# Patient Record
Sex: FEMALE | Race: BLACK OR AFRICAN AMERICAN
[De-identification: names, ages, dates, MRNs, and addresses within clinical notes are randomized per-mention and may not be internally consistent; named-entity substitution may affect disease eponyms.]

---

## 2017-07-09 ENCOUNTER — HOSPITAL ENCOUNTER (EMERGENCY)
Dept: HOSPITAL 74 - JER | Age: 67
Discharge: HOME | End: 2017-07-09
Payer: COMMERCIAL

## 2017-07-09 VITALS — HEART RATE: 83 BPM | TEMPERATURE: 99 F | SYSTOLIC BLOOD PRESSURE: 153 MMHG | DIASTOLIC BLOOD PRESSURE: 92 MMHG

## 2017-07-09 VITALS — BODY MASS INDEX: 32.9 KG/M2

## 2017-07-09 DIAGNOSIS — N20.0: ICD-10-CM

## 2017-07-09 DIAGNOSIS — K80.20: Primary | ICD-10-CM

## 2017-07-09 LAB
ALBUMIN SERPL-MCNC: 3.4 G/DL (ref 3.4–5)
ALP SERPL-CCNC: 77 U/L (ref 45–117)
ALT SERPL-CCNC: 68 U/L (ref 12–78)
ANION GAP SERPL CALC-SCNC: 10 MMOL/L (ref 8–16)
APPEARANCE UR: CLEAR
AST SERPL-CCNC: 42 U/L (ref 15–37)
BACTERIA #/AREA URNS HPF: (no result) /HPF
BASOPHILS # BLD: 0.8 % (ref 0–2)
BILIRUB SERPL-MCNC: 0.7 MG/DL (ref 0.2–1)
BILIRUB UR STRIP.AUTO-MCNC: NEGATIVE MG/DL
CALCIUM SERPL-MCNC: 9.4 MG/DL (ref 8.5–10.1)
CO2 SERPL-SCNC: 28 MMOL/L (ref 21–32)
COLOR UR: YELLOW
CREAT SERPL-MCNC: 1.4 MG/DL (ref 0.55–1.02)
DEPRECATED RDW RBC AUTO: 13 % (ref 11.6–15.6)
EOSINOPHIL # BLD: 0 % (ref 0–4.5)
GLUCOSE SERPL-MCNC: 185 MG/DL (ref 74–106)
KETONES UR QL STRIP: (no result)
LEUKOCYTE ESTERASE UR QL STRIP.AUTO: NEGATIVE
MCH RBC QN AUTO: 32 PG (ref 25.7–33.7)
MCHC RBC AUTO-ENTMCNC: 33.4 G/DL (ref 32–36)
MCV RBC: 95.8 FL (ref 80–96)
MUCOUS THREADS URNS QL MICRO: (no result)
NEUTROPHILS # BLD: 80.9 % (ref 42.8–82.8)
NITRITE UR QL STRIP: NEGATIVE
PH UR: 5 [PH] (ref 5–8)
PLATELET # BLD AUTO: 174 K/MM3 (ref 134–434)
PMV BLD: 9.8 FL (ref 7.5–11.1)
PROT SERPL-MCNC: 7.8 G/DL (ref 6.4–8.2)
PROT UR QL STRIP: (no result)
PROT UR QL STRIP: (no result)
RBC # BLD AUTO: 28 /HPF (ref 0–3)
RBC # UR STRIP: (no result) /UL
SP GR UR: 1.02 (ref 1–1.02)
UROBILINOGEN UR STRIP-MCNC: NEGATIVE E.U./DL (ref 0.2–1)
WBC # BLD AUTO: 10.3 K/MM3 (ref 4–10)
WBC # UR AUTO: 7 /HPF (ref 3–5)

## 2017-07-09 PROCEDURE — 3E03329 INTRODUCTION OF OTHER ANTI-INFECTIVE INTO PERIPHERAL VEIN, PERCUTANEOUS APPROACH: ICD-10-PCS

## 2017-07-09 PROCEDURE — 3E033NZ INTRODUCTION OF ANALGESICS, HYPNOTICS, SEDATIVES INTO PERIPHERAL VEIN, PERCUTANEOUS APPROACH: ICD-10-PCS

## 2017-07-09 PROCEDURE — 3E0337Z INTRODUCTION OF ELECTROLYTIC AND WATER BALANCE SUBSTANCE INTO PERIPHERAL VEIN, PERCUTANEOUS APPROACH: ICD-10-PCS

## 2017-07-09 PROCEDURE — 3E033GC INTRODUCTION OF OTHER THERAPEUTIC SUBSTANCE INTO PERIPHERAL VEIN, PERCUTANEOUS APPROACH: ICD-10-PCS

## 2017-07-09 NOTE — PDOC
History of Present Illness





- General


Chief Complaint: Pain


Stated Complaint: ABD PAIN


Time Seen by Provider: 07/09/17 08:39





- History of Present Illness


Initial Comments: 


07/09/17 08:50


Ms. Cook is a 67 year old female with a significant past medical history of 

surgery for an ectopic pregnancy who presents to the emergency department with 

24 hours of nausea and vomiting with 5/10 abdominal pain. She reports pain 

increases with moving and that she has been vomiting clear liquid almost 

hourly. 


The patient denies chest pain, shortness of breath, headache and dizziness. 

Denies fever, chills, diarrhea and constipation. Denies dysuria, frequency, 

urgency and hematuria. 


Allergies: n/a


Past surgical history: ectopic pregnancy removal


Social history: 


PCP:











07/09/17 09:35








Past History





- Past Medical History


Allergies/Adverse Reactions: 


 Allergies











Allergy/AdvReac Type Severity Reaction Status Date / Time


 


No Known Allergies Allergy   Verified 07/09/17 08:27











Home Medications: 


Ambulatory Orders





Metformin HCl [Metformin HCl ER] 500 mg PO DAILY 07/09/17 


Ondansetron HCl [Zofran] 4 mg PO PRN #20 tablet 07/09/17 


Oxycodone HCl/Acetaminophen [Percocet 5-325 mg Tablet] 1 tab PO Q4H #20 tablet 

MDD 6 07/09/17 








Diabetes: Yes (niddm)





- Psycho/Social/Smoking Cessation Hx


Anxiety: No


Suicidal Ideation: No


Smoking History: Never smoked


Have you smoked in the past 12 months: No


Information on smoking cessation initiated: No


Hx Alcohol Use: No


Drug/Substance Use Hx: No


Substance Use Type: None





**Review of Systems





- Review of Systems


Comments:: 


07/09/17 08:50


GENERAL/CONSTITUTIONAL: No fever or chills. No weakness.


HEAD, EYES, EARS, NOSE AND THROAT: No change in vision. No ear pain or 

discharge. No sore throat.


CARDIOVASCULAR: No chest pain or shortness of breath


RESPIRATORY: No cough, wheezing, or hemoptysis.


GASTROINTESTINAL: +nausea and vomiting, several instances over the past 24 

hours. Has not had a bowl movement since Friday.


GENITOURINARY: No dysuria, frequency, or change in urination.


MUSCULOSKELETAL: No joint or muscle swelling or pain. No neck or back pain.


SKIN: No rash


NEUROLOGIC: No headache, vertigo, loss of consciousness, or change in strength/

sensation.


ENDOCRINE: No increased thirst. No abnormal weight change


HEMATOLOGIC/LYMPHATIC: No anemia, easy bleeding, or history of blood clots.


ALLERGIC/IMMUNOLOGIC: No hives or skin allergy.











07/09/17 09:38








*Physical Exam





- Vital Signs


 Last Vital Signs











Temp Pulse Resp BP Pulse Ox


 


 98.4 F   77   18   181/108   97 


 


 07/09/17 08:27  07/09/17 08:27  07/09/17 08:27  07/09/17 08:27  07/09/17 08:27














07/09/17 14:57








- Physical Exam


Comments: 


07/09/17 08:50


GENERAL: Awake, alert, and fully oriented, in no acute distress


HEAD: No signs of trauma, normocephalic, atraumatic 


EYES: PERRLA, EOMI, sclera anicteric, conjunctiva clear


ENT: Auricles normal inspection, hearing grossly normal, nares patent, 

oropharynx clear without


exudates. Moist mucosa


NECK: Normal ROM, supple, no lymphadenopathy, JVD, or masses


LUNGS: No distress, speaks full sentences, clear to auscultation bilaterally 


HEART: Regular rate and rhythm, normal S1 and S2, no murmurs, rubs or gallops, 

peripheral pulses normal and equal bilaterally. 


ABDOMEN: +Diffusely tender midline and right  +hypoactive bowel sounds +guarding

, +rebound. Soft, no masses


EXTREMITIES: Normal inspection, Normal range of motion, no edema. No clubbing 

or cyanosis. 


NEUROLOGICAL: Cranial nerves II through XII grossly intact. Normal speech, 

normal gait, no focal sensorimotor deficits 


SKIN: Warm, Dry, normal turgor, no rashes or lesions noted. 














07/09/17 09:39








07/09/17 14:57








**Heart Score/ECG Review





- ECG Impressions


Comment:: 





07/09/17 10:46


Normal sinus rhythm, normal access, normal intervals, normal rate, normal EKG.





ED Treatment Course





- LABORATORY


CBC & Chemistry Diagram: 


 07/09/17 09:24





 07/09/17 09:24





Medical Decision Making





- Medical Decision Making


07/09/17 09:41


Ms. Cook presented in acute pain. Could be seen to be moving around to try 

to get comfortable with limited success.





Concern for Gall stones vs. Acute abdomen vs. SBO vs. appendicitis, formal US 

ordered to R/O.





Formal US showed reji and nephro lithiasis. Ordered CT to confirm.





CT confirmed, referred to surgery and urology for f/u. D/C to home care on 

percocet / zofran.





07/09/17 14:56








*DC/Admit/Observation/Transfer


Diagnosis at time of Disposition: 


 Abdominal pain





- Discharge Dispostion


Disposition: HOME





- Prescriptions


Prescriptions: 


Oxycodone HCl/Acetaminophen [Percocet 5-325 mg Tablet] 1 tab PO Q4H #20 tablet 

MDD 6


Ondansetron HCl [Zofran] 4 mg PO PRN #20 tablet





- Referrals


Referrals: 


Tramaine López MD [Primary Care Provider] - 


Tab Hamlin MD., MD [Staff Physician] - 


Johny Serrano MD [Staff Physician] - 





- Patient Instructions


Printed Discharge Instructions:  Kidney Stones -- Adult, DI for Gallstones


Additional Instructions: 


We are very sorry that you were not feeling well today. Please follow up with 

Urology and Surgery groups as listed above for gall bladder and kidney stones. 

Thank you for coming in today and we hope that you feel better soon.





- Attestations


Physician Attestion: 





07/09/17 09:40








I, Dr. Arnie Donato, attest that this document has been prepared under my 

direction and personally reviewed by me in its entirety.   I further attest, 

that it accurately reflects all work, treatment, procedures and medical decision

-making performed by me.

## 2017-07-09 NOTE — PDOC
*Physical Exam





- Vital Signs


 Last Vital Signs











Temp Pulse Resp BP Pulse Ox


 


 98.4 F   77   18   181/108   97 


 


 07/09/17 08:27  07/09/17 08:27  07/09/17 08:27  07/09/17 08:27  07/09/17 08:27

## 2017-07-09 NOTE — PDOC
Attending Attestation





- Resident


Resident Name: Arnie Donato





- ED Attending Attestation


I have performed the following: I have examined & evaluated the patient, The 

case was reviewed & discussed with the resident, I agree w/resident's findings 

& plan, Exceptions are as noted





<Redd Becerra - Last Filed: 07/09/17 09:07>





- HPI


HPI: 


The patient is a 68 yo F with a past medical history of DM who presents with 24 

hours of nausea, vomiting and R sided abdominal pain. Patient notes shes 

vomited 5 or 6 times.  She rates her abdominal pain a 6/10. Patient also notes 

associated dizziness when she stands up. Patient states her last meal before 

she was sick was pasta with meat sauce. The patient states her last BM was 2 

days ago. She denies a family Hx of gallstones. 





Allergies: NKDA


Surgical Hx: ectopic pregnancy








- Physicial Exam


PE: 


GENERAL: Awake, alert, and fully oriented, in mild distress


HEAD: No signs of trauma


EYES: PERRLA, EOMI, sclera anicteric, conjunctiva clear


ENT: Auricles normal inspection, hearing grossly normal, nares patent, 

oropharynx clear without


exudates. Moist mucosa


NECK: Normal ROM, supple, no lymphadenopathy, JVD, or masses


LUNGS: Breath sounds equal, clear to auscultation bilaterally.  No wheezes, and 

no crackles


HEART: Regular rate and rhythm, normal S1 and S2, no murmurs, rubs or gallops


ABDOMEN: Obese. + Rose's sign. Soft, R sided tenderness to palpation, 

diminished bowel sounds.  No guarding.  no rebound.  No masses. Nonreducible 

umbilical hernia that is tender to palpation. 


EXTREMITIES: Normal range of motion, no edema.  No clubbing or cyanosis. No 

cords, erythema, or tenderness


NEUROLOGICAL: Cranial nerves II through XII grossly intact.  Normal speech, 

normal gait


SKIN: Warm, Dry, normal turgor, no rashes or lesions noted.








- Medical Decision Making


The patient is a 68 yo F with a PMHx of DM who presents with nausea, vomiting 

and abdominal pain for 24 hours. 





1.) Nausea, vomiting and abdominal pain


NPO


Zofran


Morphine for pain control


US r/o gallstones


IVF


Labs 


Lactic Acid r/o sepsis





CT and US revealed that she had kidney stones and a gallstone. 


She will be treated as outpatient with surgical and neurological consultation. 

Pain and nausea will be treated with percocet and zofran. 








Documentation prepared by Monica Cerrato, acting as medical scribe for Redd Becerra MD/.











<Monica Cerrato - Last Filed: 07/09/17 17:41>

## 2017-07-10 NOTE — EKG
Test Reason : 

Blood Pressure : ***/*** mmHG

Vent. Rate : 073 BPM     Atrial Rate : 073 BPM

   P-R Int : 138 ms          QRS Dur : 068 ms

    QT Int : 370 ms       P-R-T Axes : 049 006 025 degrees

   QTc Int : 407 ms

 

NORMAL SINUS RHYTHM

MINIMAL VOLTAGE CRITERIA FOR LVH, MAY BE NORMAL VARIANT

BORDERLINE ECG

NO PREVIOUS ECGS AVAILABLE

Confirmed by PERICO PEÑA MD ([id number]) on 7/10/2017 10:41:40 AM

 

Referred By:             Confirmed By:PERICO PEÑA MD

## 2017-07-28 ENCOUNTER — HOSPITAL ENCOUNTER (OUTPATIENT)
Dept: HOSPITAL 74 - JASU-SURG | Age: 67
Discharge: HOME | End: 2017-07-28
Attending: UROLOGY
Payer: COMMERCIAL

## 2017-07-28 VITALS — TEMPERATURE: 98.3 F

## 2017-07-28 VITALS — DIASTOLIC BLOOD PRESSURE: 90 MMHG | HEART RATE: 72 BPM | SYSTOLIC BLOOD PRESSURE: 130 MMHG

## 2017-07-28 VITALS — BODY MASS INDEX: 35.9 KG/M2

## 2017-07-28 DIAGNOSIS — N20.1: Primary | ICD-10-CM

## 2017-07-28 PROCEDURE — 0T768DZ DILATION OF RIGHT URETER WITH INTRALUMINAL DEVICE, VIA NATURAL OR ARTIFICIAL OPENING ENDOSCOPIC: ICD-10-PCS | Performed by: UROLOGY

## 2017-07-28 PROCEDURE — 0TF68ZZ FRAGMENTATION IN RIGHT URETER, VIA NATURAL OR ARTIFICIAL OPENING ENDOSCOPIC: ICD-10-PCS | Performed by: UROLOGY

## 2017-07-29 NOTE — OP
DATE OF OPERATION:  07/28/2017

 

PREOPERATIVE DIAGNOSIS:  Right distal ureteral calculi x2.

 

POSTOPERATIVE DIAGNOSIS:  Right distal ureteral calculi x2.

 

PROCEDURE:  Right ureteroscopic laser lithotripsy and ureteral stent placement,

cystoscopy.

 

FINDINGS:  Two large stones in the distal right ureter.

 

DRAINS:  A 6 x 24 double-J ureteral stent.

 

SURGEON:  Rafael Urena MD

 

ESTIMATED BLOOD LOSS:  Minimal.

 

ANESTHESIA:  Aristides Case MD

 

PREOPERATIVE INDICATIONS:  The patient is a 67-year-old female who has 2 large stones

in her distal right ureter.  She has multiple small stones in the kidneys as well. 

She comes to the OR for laser lithotripsy.

 

DESCRIPTION OF OPERATION:  The patient is brought to the OR, placed on the table in

the supine position, given general anesthesia and IV antibiotics, and placed in the

modified lithotomy position.  The groin was prepped and draped sterilely.

 

Cystoscopy was performed.  The right ureteral orifice was visualized.  A wire was

passed up into the right kidney under fluoroscopic guidance, and a 10-Indonesian

dual-lumen catheter was used to dilate the UO.

 

A semi-rigid ureteroscope was passed into the right ureter slowly and easily.  The

stones were visualized in the distal ureter.  Using a holmium laser fiber, the stones

were broken up into small pieces of dust.  No other stones of size were seen along

the course of the ureter.  Over the remaining wire, a 6 x 14 double-J ureteral stent

was placed with 1 loop in the bladder and 1 loop in the kidney.  Bladder was emptied.

 The patient was woken up.

 

 

RIANNA [id number]

DD: 07/28/2017 15:14

DT: 07/29/2017 11:58

Job #:  [id number]

## 2017-08-25 NOTE — HP
PREPROCEDURE NOTE/PREADMISSION HISTORY AND PHYSICAL

 

DATE OF ADMISSION:  08/30/2017

 

HISTORY:  This is a 67-year-old woman admitted to the hospital for laparoscopic

removal of her gallbladder.  For the past month, the patient has been experiencing

intermittent bouts of right upper quadrant pain radiating to her right flank and

around to her right back.  An ultrasound of the abdomen had demonstrated evidence of

cholelithiasis.  There was incidental note of right nephrolithiasis and mild right

hydronephrosis.

 

Apparently, the patient has had her kidney stones addressed.  She presents at this

time for management of her biliary tree.

 

On close questioning, she has no specific fatty food intolerance by history as she

refrains from fatty foods just based on advice given to her by her general medical

practitioners considering her underlying history of diabetes and hypertension.  There

has been no unintentional weight loss.  There is no history of jaundice.

 

PAST MEDICAL HISTORY:  Significant for hypertension and diabetes.  No known history

of heart disease, respiratory, renal, or hepatic insufficiency.

 

PAST SURGICAL HISTORY:  Essentially nil other than right ureteroscopic laser

lithotripsy and ureteral stent placement.

 

ALLERGIES:  None known.

 

REGULAR MEDICATIONS:  Metformin and antihypertensives, the name of which the patient

cannot recall.

 

SOCIAL HISTORY:  Negative tobacco.  Social alcohol/minimal.

 

FAMILY HISTORY:  Essentially nil.

 

REVIEW OF SYSTEMS:  Nil.

 

PHYSICAL EXAMINATION:

Abdomen:  Large, soft, nontender.  Umbilical and upper midline, chronically

incarcerated hernias are noted.  Abdomen soft and nontender otherwise.

 

Ultrasound evaluation of the abdomen demonstrating multiple calculi within the lumen

of the gallbladder.  No ductal dilatation.

 

IMPRESSION:  Symptomatic cholelithiasis/chronic cholecystitis.

 

PLAN:  Laparoscopic cholecystectomy/possible open gallbladder removal.

Indications, alternatives, possible complications reviewed.  Consent obtained.

Patient will be seen preoperatively by Dr. López.  Please refer to his notes for

those medical details.

 

YAMILEX EPSTEIN M.D.

 

[id number]

DD: 08/24/2017 15:43

DT: 08/24/2017 16:09

Job #:  [id number]

cc:  Tramaine López MD

## 2017-08-30 ENCOUNTER — HOSPITAL ENCOUNTER (OUTPATIENT)
Dept: HOSPITAL 74 - JASUSAT | Age: 67
LOS: 1 days | Discharge: HOME | End: 2017-08-31
Attending: SURGERY
Payer: COMMERCIAL

## 2017-08-30 VITALS — BODY MASS INDEX: 37.8 KG/M2

## 2017-08-30 DIAGNOSIS — K80.10: Primary | ICD-10-CM

## 2017-08-30 PROCEDURE — 0FT44ZZ RESECTION OF GALLBLADDER, PERCUTANEOUS ENDOSCOPIC APPROACH: ICD-10-PCS | Performed by: SURGERY

## 2017-08-30 RX ADMIN — INSULIN ASPART SCH: 100 INJECTION, SOLUTION INTRAVENOUS; SUBCUTANEOUS at 12:00

## 2017-08-30 RX ADMIN — INSULIN ASPART SCH: 100 INJECTION, SOLUTION INTRAVENOUS; SUBCUTANEOUS at 17:04

## 2017-08-30 RX ADMIN — ALBUTEROL SULFATE ONE: 2.5 SOLUTION RESPIRATORY (INHALATION) at 10:00

## 2017-08-30 RX ADMIN — ALBUTEROL SULFATE ONE AMP: 2.5 SOLUTION RESPIRATORY (INHALATION) at 09:35

## 2017-08-30 RX ADMIN — LISINOPRIL SCH: 5 TABLET ORAL at 12:00

## 2017-08-30 RX ADMIN — SERTRALINE HYDROCHLORIDE SCH: 25 TABLET ORAL at 12:00

## 2017-08-30 RX ADMIN — INSULIN ASPART SCH UNITS: 100 INJECTION, SOLUTION INTRAVENOUS; SUBCUTANEOUS at 21:34

## 2017-08-30 RX ADMIN — ENOXAPARIN SODIUM SCH: 40 INJECTION SUBCUTANEOUS at 15:55

## 2017-08-30 NOTE — OP
DATE OF OPERATION:  08/30/2017

 

PREOPERATIVE DIAGNOSIS:  Chronic cholecystitis/cholelithiasis.

 

POSTOPERATIVE DIAGNOSIS:  Chronic cholecystitis/cholelithiasis.

 

PROCEDURE:  Laparoscopic cholecystectomy.

 

OPERATING SURGEON:  Laz Weller MD

 

FIRST ASSISTANT:  Tank Gordillo DO

 

ANESTHESIA:  Ana Nogueira MD (general)

 

HISTORY:  A 67-year-old woman who presents for laparoscopic removal of her

gallbladder as management of symptomatic cholelithiasis.  Patient also with 2 
large

anterior abdominal wall hernias, one involving the supraumbilical midline, the 
other

involving the entire central ring and extending into the infraumbilical 
midline. 

These hernias are not to be addressed at the time of this procedure, but will 
require

subsequent repair.

 

Indications, alternatives, possible complications reviewed.  Consent obtained.

 

PROCEDURE:  With the patient in the supine position, after general anesthesia, 
the

abdomen was prepped and draped in the usual sterile fashion using chlorhexidine.

 

A small incision was made in the infraumbilical midline, below a hernia.  The

subcutaneous tissues were .  A Veress needle was placed through the 
small

defect into the abdominal cavity.  The abdominal cavity was insufflated to an

adequate pressure and volume using CO2 gas.

 

The Veress needle was removed.  11mm trocar port placed through the 
infraumbilical

wound.  The camera once passed through this port and the intraabdominal cavity

visualized.

 

Maneuvering around the midline hernias, which appeared to be large fat-
containing

hernias, the upper abdomen was ultimately visualized without difficulty.

 

Under direct vision, two 5 mm right anterolateral ports were placed.   Clamps 
were

passed to maintain traction on the gallbladder and aid in the dissection.  An 
11 mm

port was placed in the epigastrium through which the operating instruments were

passed.

 

Exploration of the upper abdomen revealed several adhesions about the 
gallbladder. 

These were taken down under direct vision exposing the entire gallbladder and

hepatoduodenal ligament.

 

The peritoneum and hepatoduodenal ligament was incised.  The cystic duct was

identified.  The cystic duct-bile duct junction was noted.  The cystic duct was

clipped proximally, distally and divided.

 

The adjacent cystic artery was identified, clipped and divided as well.  The

gallbladder was then removed from the gallbladder bed, lysing its attachment to 
the

liver using the electrocautery.  There was posterior loss of plane consistent 
with

chronicity.

 

The liver itself had a significant nodular texture to it and thus raised the

suspicion for fatty liver.

 

The gallbladder was ultimately removed from the liver bed and placed in a 
retrieval

bag.  The oozing from the liver bed was controlled with the electrocautery and

ultimately with a small amount of Floseal for a larger sinusoid.

 

The right upper quadrant was irrigated.  The irrigant retrieved.  All ports were

removed under direct vision, no bleeding identified.  Ultimately, the 
gallbladder was

delivered through the infraumbilical port site without difficulty.

 

The fascia at the infraumbilical wound was closed using interrupted No. 1 Vicryl

sutures.  All skin wounds were closed using subcuticular 4-0 Biosyn sutures.

 

Needle and instrument counts correct.

 

ESTIMATED BLOOD LOSS:  Minimal.

 

SPECIMEN:  Gallbladder.

 

DRAINS:  None.

 

Patient tolerated the procedure.  The procedure was terminated.

 

 

RIANNA WEBSTER/[id number]

DD: 08/30/2017 09:14

DT: 08/30/2017 10:21

Job #:  [id number]



cc:  Tramaine López MD

MTDD

## 2017-08-31 VITALS — SYSTOLIC BLOOD PRESSURE: 124 MMHG | DIASTOLIC BLOOD PRESSURE: 52 MMHG | TEMPERATURE: 98.5 F | HEART RATE: 71 BPM

## 2017-08-31 RX ADMIN — INSULIN ASPART SCH: 100 INJECTION, SOLUTION INTRAVENOUS; SUBCUTANEOUS at 06:08

## 2017-08-31 RX ADMIN — INSULIN ASPART SCH: 100 INJECTION, SOLUTION INTRAVENOUS; SUBCUTANEOUS at 10:33

## 2017-08-31 RX ADMIN — LISINOPRIL SCH MG: 5 TABLET ORAL at 10:33

## 2017-08-31 RX ADMIN — ENOXAPARIN SODIUM SCH MG: 40 INJECTION SUBCUTANEOUS at 10:32

## 2017-08-31 RX ADMIN — SERTRALINE HYDROCHLORIDE SCH MG: 25 TABLET ORAL at 10:33

## 2017-08-31 NOTE — PN
Progress Note (short form)





- Note


Progress Note: 


Anesthesiology Post-op


POD#1 s/p laparoscopic cholecystectomy under GA. Pt. does have some pain but it 

is relieved somewhat with medication. I encouraged incentive spirometry. 

Otherwise, VSS, no apparent anesthesia-related issues.

## 2017-08-31 NOTE — PATH
Surgical Pathology Report



Patient Name:  SUN BARLOW

Accession #:  [id number]

Med. Rec. #:  [id number]                                                        

   /Age/Gender:  1950 (Age: 67) / F

Account:  [id number]                                                          

             Location: [address] [place] [address]/[facility]

Taken:  2017

Received:  2017

Reported:  2017

Physicians:  Laz Weller M.D.

  



Specimen(s) Received

 GALLBLADDER 





Clinical History

Calculus of gallbladder







Final Diagnosis

GALLBLADDER, CHOLECYSTECTOMY:

CHRONIC CHOLECYSTITIS, CHOLESTEROLOSIS, AND CHOLELITHIASIS.





***Electronically Signed***

Modesto Palacio M.D.





Gross Description

Received in formalin, labeled "gallbladder," is a 7.6 x 2.8 x 2.5 cm.

gallbladder with a 0.2 cm. in length portion of cystic duct attached. The outer

surface is tan-pink and varies from smooth to shaggy. The lumen contains green,

tenacious bile as well as multiple yellow, irregular to fragmented choleliths

ranging from 0.1-2.0 cm in greatest dimension. The mucosa is tan-red with gold

cholesterol stippling. The wall of the gallbladder averages 0.1 cm. in

thickness. Representative sections are submitted in one cassette.

## 2018-04-09 NOTE — HP
HISTORY:  This is a 68-year-old woman admitted to the hospital for open bilateral

component separation repair of a complex chronically incarcerated ventral hernia.  

 

Patient had recently presented with biliary disease back in August of this past year.

 At that time, she underwent a laparoscopic removal of her gallbladder.  Her hernias

were noted at that time, but definitive management deferred considering the potential

for mesh contamination at the time of cholecystectomy.  Now, that her biliary issue

has been resolved, she presents now for definitive management of her anterior

abdominal wall.  

 

According to the patient, she has had abdominal wall hernias for quite some time.  

 

PAST MEDICAL HISTORY:  Is significant for hypertension and diabetes.  No known

history of heart disease, respiratory, renal, or hepatic insufficiency.  

 

PAST SURGICAL HISTORY:  Significant for recent laparoscopic cholecystectomy in 2017

as well as previous ureteroscopic laser lithotripsy and ureteral stent placement.  

 

ALLERGIES:  None known.  

 

REGULAR MEDICATIONS:  Metformin, antihypertensives.  

 

SOCIAL HISTORY:  Negative for tobacco.  Social alcohol/minimal.  

 

FAMILY HISTORY:  Nil. 

 

REVIEW OF SYSTEMS:  Nil.  

 

PHYSICAL EXAMINATION:

Abdomen:  Large, soft, nontender.  Obvious hernias involving of the upper midline

where the hernia is chronically incarcerated.  There is also a large hernia by the

involving the center ring of the abdomen extending into the lower midline, which is

also chronically incarcerated.  The abdomen itself is soft and nontender otherwise.

 

IMPRESSION:  Longstanding complex chronically incarcerated ventral hernia.

 

PLAN:  Open bilateral component separation, reduction, and repair of chronically

incarcerated complex ventral hernia with mesh.  

 

Indications, alternatives, possible complications were reviewed.  Issues that relate

to the mesh including, but not limited to, infection, migration have been reviewed

with the patient, as well.  Consent obtained.

 

Patient is to be seen preoperatively by Dr. López of the medical service.  Please

refer to his notes for those medical details.  

 

 

YAMILEX EPSTEIN M.D.

 

[id number]

DD: 03/06/2018 11:49

DT: 03/06/2018 13:34

Job #:  [id number]

 

cc:  Tramaine López MD

## 2018-04-11 ENCOUNTER — HOSPITAL ENCOUNTER (INPATIENT)
Dept: HOSPITAL 74 - JASUSAT | Age: 68
LOS: 1 days | Discharge: HOME HEALTH | DRG: 355 | End: 2018-04-12
Attending: SURGERY | Admitting: SURGERY
Payer: COMMERCIAL

## 2018-04-11 VITALS — BODY MASS INDEX: 37.2 KG/M2

## 2018-04-11 DIAGNOSIS — I10: ICD-10-CM

## 2018-04-11 DIAGNOSIS — K66.0: ICD-10-CM

## 2018-04-11 DIAGNOSIS — K43.0: Primary | ICD-10-CM

## 2018-04-11 DIAGNOSIS — E11.9: ICD-10-CM

## 2018-04-11 PROCEDURE — 0DBU0ZZ EXCISION OF OMENTUM, OPEN APPROACH: ICD-10-PCS | Performed by: SURGERY

## 2018-04-11 PROCEDURE — 0WUF0JZ SUPPLEMENT ABDOMINAL WALL WITH SYNTHETIC SUBSTITUTE, OPEN APPROACH: ICD-10-PCS | Performed by: SURGERY

## 2018-04-12 VITALS — DIASTOLIC BLOOD PRESSURE: 67 MMHG | HEART RATE: 79 BPM | SYSTOLIC BLOOD PRESSURE: 113 MMHG | TEMPERATURE: 98.4 F

## 2018-04-12 RX ADMIN — IBUPROFEN SCH: 800 INJECTION INTRAVENOUS at 17:47

## 2018-04-12 NOTE — OP
DATE OF OPERATION:  04/11/2018

 

PREOPERATIVE DIAGNOSIS:  Complex, chronically incarcerated, ventral/incisional

abdominal wall hernia.

 

POSTOPERATIVE DIAGNOSIS:  Complex, chronically incarcerated, ventral/incisional

abdominal wall hernia.

 

PROCEDURE:  Bilateral component-separation, repair of complex, chronically

incarcerated, ventral/incisional abdominal wall hernia with mesh/partial 
omentectomy.

 

OPERATING SURGEON:  Yamilex Weller MD

 

FIRST ASSISTANT:  Hector Lindsay MD

 

ANESTHESIA:  Shane Dominguez MD (general)

 

HISTORY:  A 68-year-old woman who presents for repair of a rather longstanding,

complex, chronically incarcerated, abdominal wall hernia.  Indications, 
alternatives,

and possible complications reviewed.  Consent obtained.

 

DESCRIPTION OF PROCEDURE:  With the patient in the supine position, and after 
general

anesthesia, the abdomen was prepped and draped in usual sterile fashion using

chlorhexidine.

 

A generous midline incision was made directly over the complex hernia and 
deepened

into the subcutaneous space.  The hernia sac was easily encountered.  It was 
cleaned

circumferentially and followed to the level of the fascial ring.

 

The sac was opened, found to contain a portion of omentum which was adhesed to

the sac itself.  Lysis of adhesions ensued as well as partial omentectomy, and a

portion of the omentum and sac were delivered.

 

First directing our attention to the patient's right side, the right posterior 
rectus

sheath was  from the overlying rectus muscle fibers using sharp 
dissection. 

The dissection in this plane was carried out inferiorly, laterally, and 
superiorly,

ultimately arriving at the junction of the rectus musculature with the oblique 
and

transversus muscles.

 

Separation of the oblique musculature from the underlying transverse muscle 
ensued

using sharp dissection, creating a space which was contiguous with the 
retrorectus

space.  Again, the dissection was carried out in the inferior, lateral, and 
superior

directions.

 

Now directing our attention to the patient's left side, the posterior sheath was

 from the overlying rectus muscle fibers using sharp dissection once 
again. 

Again, the retrorectus space was created encompassing the inferior, lateral, and

superior regions, ultimately arriving again at the junction of the rectus 
musculature

with the oblique and transversus muscles.

 

Separation of the overlying external oblique musculature from the underlying

transversus ensued under direct vision.  That space was mobilized again 
inferiorly,

laterally, and superiorly.  That space communicated with the left retrorectus 
space.

 

The posterior midline was then recreated, approximating the posterior sheath 
using a

continuous 2-0 V-Loc suture.

 

A Composite mesh was then fashioned at the table using a piece of 30 x 30 
Covidien

Versatex mesh which was sutured to a piece of DANIEL Bio using circumferential 3-0

Vicryl sutures.

 

The mesh was placed in the retrorectus space with the DANIEL Bio side down and 
the soft

permanent side up.

 

The mesh was laid out in the space that had been created by the dissection.  
The mesh

was fashioned circumferentially to the overlying musculature using 
counterpalpation

and AbsorbaTacker.

 

After the mesh was fixed appropriately, the wound was irrigated and irrigant

retrieved.  Adequate hemostasis ensured.

 

The fascia over the anterior fascia was then closed using both continuous and

interrupted 0 PDS sutures, leaving the Composite mesh entirely in the 
retrorectus

space.

 

The subcutaneous tissues were then irrigated and adequate hemostasis ensured.  A

Emiliano-Nur drain was left in the subcutaneous space and exited through a 
separate

stab wound at the level of the skin of the right lower abdominal wall where it 
was

tacked in place using 2-0 silk suture.

 

The skin edges were approximated using metallic clips.  Dermabond was used to 
seal

the wound.

 

At the completion of the procedure, needle, sponge, and instrument count was 
correct.

 

ESTIMATED BLOOD LOSS:  100 mL

 

SPECIMEN:  Portion of omentum.

 

IMPLANT:  Composite mesh (Versatex/DANIEL Bio).

 

Patient tolerated the procedure, and the procedure was terminated.

 

 

YAMILEX WELLER M.D.

 

[id number]

DD: 04/11/2018 15:55

DT: 04/12/2018 09:42

Job #:  [id number]

MTDD

## 2018-04-12 NOTE — HP
UPDATED HISTORY AND PHYSICAL

 

DATE OF ADMISSION:  04/11/2018

 

Since the patient's previous dictated history and physical of March 6, 2018, there

has been no change in her history and physical.

 

Patient presents for bilateral component separation and repair of complex,

chronically incarcerated, ventral hernia with mesh.

 

Patient was noted to have a very large, complex hernia when she presented in August

with biliary disease and required cholecystectomy.  She underwent cholecystectomy,

and the hernia surgery was deferred at that time.  She has recovered and now presents

for repair of her abdominal wall.

 

PAST MEDICAL HISTORY:  Significant for diabetes and hypertension.  No known history

of heart disease, respiratory, renal, or hepatic insufficiency.

 

PAST SURGICAL HISTORY:  Significant for laparoscopic cholecystectomy as well as

previous ureteroscopic laser lithotripsy and ureteral stents.

 

ALLERGIES:  None known.

 

REGULAR MEDICATIONS:  Metformin, antihypertensive.

 

SOCIAL HISTORY:  Negative tobacco.  Social alcohol/occasional.

 

FAMILY HISTORY:  Nil.

 

REVIEW OF SYSTEMS:  Nil.

 

PHYSICAL EXAMINATION:

Abdomen:  Large, soft, nontender.  Obvious hernia involving the upper midline which

is chronically incarcerated.  There is also a large hernia involving the central ring

of the abdomen as well as herniation of the lower midline which is also chronically

incarcerated.

 

IMPRESSION:  Longstanding, complex, chronically incarcerated, ventral/incisional

abdominal wall hernia.

 

PLAN:  Open bilateral component-separation reduction and repair of chronically

incarcerated, complex ventral hernia with mesh.

 

Indications, alternatives, possible complications reviewed.  Consent obtained.

 

Please refer to preoperative note of Dr. López for medical details.

 

 

YAMILEX EPSTEIN M.D.

 

[id number]

DD: 04/11/2018 14:15

DT: 04/12/2018 10:18

Job #:  [id number]

## 2018-04-12 NOTE — DS
DATE OF ADMISSION:  04/11/2018

 

DATE OF DISCHARGE:  04/12/2018

 

ADMITTING DIAGNOSES:  Complex incisional hernia with pre-existing hypertension,

diabetes, and depression.

 

DISCHARGE DIAGNOSES:  Complex incisional hernia with pre-existing hypertension,

diabetes, and depression.

 

BRIEF HISTORY:  A 68-year-old female who presented to [facility]

for surgical management of a complex incisional hernia.  She underwent repair of the

hernia with mesh utilizing component separation and myofascial release on April 11, 2018.  Please reference Dr. Weller's operative note.  She is being anticipated

discharge today, April 12, 2018.  She is tolerating her diet.  She is ambulating and

voiding, and her pain is controlled with oral analgesics.  She will go home with her

usual home medications of hydrochlorothiazide, lisinopril, metformin, and Zoloft. 

She will have an additional prescription for Percocet which she will take as needed

for pain.  She is being evaluated by the physical therapy service prior to discharge

to ensure that she can safely navigate 12 steps which are in her house.  She will

follow with Dr. Weller next week to be evaluated for drain removal. She is okay to

walk, expected to be okay to climb stairs.  She will not shower.  She will not lift

anything over 20 pounds, and she can be expected to be out from work for 2-4 weeks. 

At the time of her discharge, her incision is clean with no bleeding, no signs of

infection.

 

 

DO GAVIOTA MALAVE/[id number]

DD: 04/12/2018 13:54

DT: 04/12/2018 14:09

Job #:  [id number]

## 2018-04-16 NOTE — PATH
Surgical Pathology Report



Patient Name:  SUN BARLOW

Accession #:  [id number]

Med. Rec. #:  [id number]                                                        

   /Age/Gender:  1950 (Age: 68) / F

Account:  [id number]                                                          

             Location: [facility] MED/SURG

Taken:  2018

Received:  2018

Reported:  2018

Physicians:  Laz Weller M.D.

  



Specimen(s) Received

 OMENTUM PORTION 





Clinical History

Ventral hernia







Final Diagnosis

SOFT TISSUE, ABDOMINAL WALL, EXCISION:

FIBROMEMBRANOUS TISSUE CONSISTENT WITH HERNIA SAC, AND BENIGN ADIPOSE TISSUE

CONSISTENT WITH PORTION OF OMENTUM.





***Electronically Signed***

Modesto Palacio M.D.





Gross Description

Received in formalin labeled "portion of omentum" multiple fragments of

fibromembranous tissue consistent with hernia sac and yellow lobulated omental

adipose soft tissue measuring 7 x 7 x 2.5 cm. Representative sections are

submitted in one cassette.

KOJO/2018



mateo/2018

## 2022-01-19 ENCOUNTER — HOSPITAL ENCOUNTER (OUTPATIENT)
Dept: HOSPITAL 74 - JRADUS-SUR | Age: 72
Discharge: HOME | End: 2022-01-19
Attending: INTERNAL MEDICINE
Payer: COMMERCIAL

## 2022-01-19 DIAGNOSIS — C50.912: Primary | ICD-10-CM

## 2022-01-19 PROCEDURE — A4648 IMPLANTABLE TISSUE MARKER: HCPCS

## 2022-01-19 PROCEDURE — 0H9U3ZX DRAINAGE OF LEFT BREAST, PERCUTANEOUS APPROACH, DIAGNOSTIC: ICD-10-PCS

## 2022-08-24 ENCOUNTER — HOSPITAL ENCOUNTER (OUTPATIENT)
Dept: HOSPITAL 74 - FASU | Age: 72
Discharge: HOME | End: 2022-08-24
Attending: OPHTHALMOLOGY
Payer: COMMERCIAL

## 2022-08-24 VITALS — HEART RATE: 64 BPM | SYSTOLIC BLOOD PRESSURE: 154 MMHG | DIASTOLIC BLOOD PRESSURE: 78 MMHG

## 2022-08-24 VITALS — BODY MASS INDEX: 36.6 KG/M2

## 2022-08-24 VITALS — TEMPERATURE: 97.7 F | RESPIRATION RATE: 12 BRPM

## 2022-08-24 DIAGNOSIS — H26.8: Primary | ICD-10-CM

## 2022-08-24 PROCEDURE — 08RJ3JZ REPLACEMENT OF RIGHT LENS WITH SYNTHETIC SUBSTITUTE, PERCUTANEOUS APPROACH: ICD-10-PCS | Performed by: OPHTHALMOLOGY

## 2022-08-24 PROCEDURE — 66984 XCAPSL CTRC RMVL W/O ECP: CPT

## 2022-09-28 ENCOUNTER — HOSPITAL ENCOUNTER (OUTPATIENT)
Dept: HOSPITAL 74 - FASU | Age: 72
Discharge: HOME | End: 2022-09-28
Attending: OPHTHALMOLOGY
Payer: COMMERCIAL

## 2022-09-28 VITALS — RESPIRATION RATE: 20 BRPM | TEMPERATURE: 98.1 F | HEART RATE: 65 BPM

## 2022-09-28 VITALS — DIASTOLIC BLOOD PRESSURE: 88 MMHG | SYSTOLIC BLOOD PRESSURE: 144 MMHG

## 2022-09-28 VITALS — BODY MASS INDEX: 36.6 KG/M2

## 2022-09-28 DIAGNOSIS — H26.8: Primary | ICD-10-CM

## 2022-09-28 PROCEDURE — 08RK3JZ REPLACEMENT OF LEFT LENS WITH SYNTHETIC SUBSTITUTE, PERCUTANEOUS APPROACH: ICD-10-PCS | Performed by: OPHTHALMOLOGY

## 2022-09-28 PROCEDURE — 66984 XCAPSL CTRC RMVL W/O ECP: CPT

## 2022-09-28 RX ADMIN — TROPICAMIDE ONE DROP: 10 SOLUTION/ DROPS OPHTHALMIC at 09:05

## 2022-09-28 RX ADMIN — CYCLOPENTOLATE HYDROCHLORIDE ONE DROP: 20 SOLUTION/ DROPS OPHTHALMIC at 09:00

## 2022-09-28 RX ADMIN — PHENYLEPHRINE HYDROCHLORIDE ONE DROP: 0.25 SPRAY NASAL at 09:00

## 2022-09-28 RX ADMIN — TROPICAMIDE ONE DROP: 10 SOLUTION/ DROPS OPHTHALMIC at 09:00

## 2022-09-28 RX ADMIN — TROPICAMIDE ONE DROP: 10 SOLUTION/ DROPS OPHTHALMIC at 09:10

## 2022-09-28 RX ADMIN — CIPROFLOXACIN HYDROCHLORIDE ONE DROP: 3 SOLUTION/ DROPS OPHTHALMIC at 09:05

## 2022-09-28 RX ADMIN — CIPROFLOXACIN HYDROCHLORIDE ONE DROP: 3 SOLUTION/ DROPS OPHTHALMIC at 09:00

## 2022-09-28 RX ADMIN — CYCLOPENTOLATE HYDROCHLORIDE ONE DROP: 20 SOLUTION/ DROPS OPHTHALMIC at 09:10

## 2022-09-28 RX ADMIN — CYCLOPENTOLATE HYDROCHLORIDE ONE DROP: 20 SOLUTION/ DROPS OPHTHALMIC at 09:05

## 2022-09-28 RX ADMIN — CIPROFLOXACIN HYDROCHLORIDE ONE DROP: 3 SOLUTION/ DROPS OPHTHALMIC at 09:11

## 2022-09-28 RX ADMIN — PHENYLEPHRINE HYDROCHLORIDE ONE DROP: 0.25 SPRAY NASAL at 09:05

## 2022-09-28 RX ADMIN — PHENYLEPHRINE HYDROCHLORIDE ONE DROP: 0.25 SPRAY NASAL at 09:10

## 2023-01-25 ENCOUNTER — HOSPITAL ENCOUNTER (INPATIENT)
Dept: HOSPITAL 74 - FM/S | Age: 73
LOS: 2 days | Discharge: HOME | DRG: 470 | End: 2023-01-27
Attending: ORTHOPAEDIC SURGERY | Admitting: ORTHOPAEDIC SURGERY
Payer: COMMERCIAL

## 2023-01-25 VITALS — BODY MASS INDEX: 36.2 KG/M2

## 2023-01-25 DIAGNOSIS — M17.12: Primary | ICD-10-CM

## 2023-01-25 PROCEDURE — C1889 IMPLANT/INSERT DEVICE, NOC: HCPCS

## 2023-01-25 PROCEDURE — U0003 INFECTIOUS AGENT DETECTION BY NUCLEIC ACID (DNA OR RNA); SEVERE ACUTE RESPIRATORY SYNDROME CORONAVIRUS 2 (SARS-COV-2) (CORONAVIRUS DISEASE [COVID-19]), AMPLIFIED PROBE TECHNIQUE, MAKING USE OF HIGH THROUGHPUT TECHNOLOGIES AS DESCRIBED BY CMS-2020-01-R: HCPCS

## 2023-01-25 PROCEDURE — U0005 INFEC AGEN DETEC AMPLI PROBE: HCPCS

## 2023-01-25 PROCEDURE — 0SRD0J9 REPLACEMENT OF LEFT KNEE JOINT WITH SYNTHETIC SUBSTITUTE, CEMENTED, OPEN APPROACH: ICD-10-PCS | Performed by: ORTHOPAEDIC SURGERY

## 2023-01-25 PROCEDURE — C1776 JOINT DEVICE (IMPLANTABLE): HCPCS

## 2023-01-25 RX ADMIN — CEFAZOLIN SCH MLS/HR: 2 INJECTION, POWDER, FOR SOLUTION INTRAMUSCULAR; INTRAVENOUS at 19:50

## 2023-01-25 RX ADMIN — INSULIN ASPART SCH UNITS: 100 INJECTION, SOLUTION INTRAVENOUS; SUBCUTANEOUS at 21:16

## 2023-01-25 RX ADMIN — ASPIRIN 81 MG SCH MG: 81 TABLET ORAL at 21:16

## 2023-01-25 RX ADMIN — FAMOTIDINE SCH MG: 20 TABLET ORAL at 21:16

## 2023-01-25 RX ADMIN — DOCUSATE SODIUM,SENNOSIDES SCH TABLET: 50; 8.6 TABLET, FILM COATED ORAL at 21:16

## 2023-01-25 RX ADMIN — ACETAMINOPHEN SCH MG: 10 INJECTION, SOLUTION INTRAVENOUS at 20:31

## 2023-01-25 RX ADMIN — INSULIN ASPART SCH: 100 INJECTION, SOLUTION INTRAVENOUS; SUBCUTANEOUS at 16:40

## 2023-01-26 RX ADMIN — INSULIN ASPART SCH UNITS: 100 INJECTION, SOLUTION INTRAVENOUS; SUBCUTANEOUS at 21:06

## 2023-01-26 RX ADMIN — OXYCODONE HYDROCHLORIDE AND ACETAMINOPHEN SCH MG: 500 TABLET ORAL at 09:24

## 2023-01-26 RX ADMIN — SERTRALINE HYDROCHLORIDE SCH MG: 50 TABLET ORAL at 09:24

## 2023-01-26 RX ADMIN — INSULIN ASPART SCH: 100 INJECTION, SOLUTION INTRAVENOUS; SUBCUTANEOUS at 06:48

## 2023-01-26 RX ADMIN — INSULIN ASPART SCH: 100 INJECTION, SOLUTION INTRAVENOUS; SUBCUTANEOUS at 12:08

## 2023-01-26 RX ADMIN — MULTIVITAMIN TABLET SCH TAB: TABLET at 09:23

## 2023-01-26 RX ADMIN — ACETAMINOPHEN SCH: 10 INJECTION, SOLUTION INTRAVENOUS at 20:20

## 2023-01-26 RX ADMIN — ASPIRIN 81 MG SCH MG: 81 TABLET ORAL at 09:23

## 2023-01-26 RX ADMIN — HYDROCHLOROTHIAZIDE SCH MG: 25 TABLET ORAL at 09:25

## 2023-01-26 RX ADMIN — ACETAMINOPHEN SCH: 10 INJECTION, SOLUTION INTRAVENOUS at 14:06

## 2023-01-26 RX ADMIN — ANASTROZOLE SCH MG: 1 TABLET, COATED ORAL at 09:25

## 2023-01-26 RX ADMIN — FAMOTIDINE SCH MG: 20 TABLET ORAL at 21:06

## 2023-01-26 RX ADMIN — FAMOTIDINE SCH MG: 20 TABLET ORAL at 09:24

## 2023-01-26 RX ADMIN — LISINOPRIL SCH MG: 20 TABLET ORAL at 09:25

## 2023-01-26 RX ADMIN — CEFAZOLIN SCH MLS/HR: 2 INJECTION, POWDER, FOR SOLUTION INTRAMUSCULAR; INTRAVENOUS at 02:16

## 2023-01-26 RX ADMIN — ACETAMINOPHEN SCH MG: 10 INJECTION, SOLUTION INTRAVENOUS at 03:11

## 2023-01-26 RX ADMIN — DOCUSATE SODIUM,SENNOSIDES SCH TABLET: 50; 8.6 TABLET, FILM COATED ORAL at 21:06

## 2023-01-26 RX ADMIN — DOCUSATE SODIUM,SENNOSIDES SCH TABLET: 50; 8.6 TABLET, FILM COATED ORAL at 09:24

## 2023-01-26 RX ADMIN — INSULIN ASPART SCH: 100 INJECTION, SOLUTION INTRAVENOUS; SUBCUTANEOUS at 17:12

## 2023-01-26 RX ADMIN — ASPIRIN 81 MG SCH MG: 81 TABLET ORAL at 21:06

## 2023-01-26 RX ADMIN — ACETAMINOPHEN SCH MG: 10 INJECTION, SOLUTION INTRAVENOUS at 18:42

## 2023-01-27 VITALS — HEART RATE: 68 BPM | DIASTOLIC BLOOD PRESSURE: 72 MMHG | SYSTOLIC BLOOD PRESSURE: 150 MMHG | RESPIRATION RATE: 18 BRPM

## 2023-01-27 VITALS — TEMPERATURE: 98.2 F

## 2023-01-27 RX ADMIN — FAMOTIDINE SCH MG: 20 TABLET ORAL at 09:45

## 2023-01-27 RX ADMIN — ASPIRIN 81 MG SCH MG: 81 TABLET ORAL at 09:48

## 2023-01-27 RX ADMIN — SERTRALINE HYDROCHLORIDE SCH MG: 50 TABLET ORAL at 09:48

## 2023-01-27 RX ADMIN — HYDROCHLOROTHIAZIDE SCH MG: 25 TABLET ORAL at 09:49

## 2023-01-27 RX ADMIN — ANASTROZOLE SCH MG: 1 TABLET, COATED ORAL at 09:48

## 2023-01-27 RX ADMIN — LISINOPRIL SCH MG: 20 TABLET ORAL at 09:48

## 2023-01-27 RX ADMIN — OXYCODONE HYDROCHLORIDE AND ACETAMINOPHEN SCH MG: 500 TABLET ORAL at 09:49

## 2023-01-27 RX ADMIN — ACETAMINOPHEN SCH MG: 10 INJECTION, SOLUTION INTRAVENOUS at 03:26

## 2023-01-27 RX ADMIN — INSULIN ASPART SCH: 100 INJECTION, SOLUTION INTRAVENOUS; SUBCUTANEOUS at 06:34

## 2023-01-27 RX ADMIN — DOCUSATE SODIUM,SENNOSIDES SCH TABLET: 50; 8.6 TABLET, FILM COATED ORAL at 09:47

## 2023-01-27 RX ADMIN — MULTIVITAMIN TABLET SCH TAB: TABLET at 09:48

## 2024-08-11 ENCOUNTER — HOSPITAL ENCOUNTER (INPATIENT)
Dept: HOSPITAL 74 - JER | Age: 74
LOS: 4 days | Discharge: HOME | DRG: 661 | End: 2024-08-15
Attending: GENERAL ACUTE CARE HOSPITAL | Admitting: INTERNAL MEDICINE
Payer: COMMERCIAL

## 2024-08-11 VITALS — BODY MASS INDEX: 37.4 KG/M2

## 2024-08-11 DIAGNOSIS — E78.5: ICD-10-CM

## 2024-08-11 DIAGNOSIS — E83.42: ICD-10-CM

## 2024-08-11 DIAGNOSIS — N13.2: Primary | ICD-10-CM

## 2024-08-11 DIAGNOSIS — E86.0: ICD-10-CM

## 2024-08-11 DIAGNOSIS — E11.22: ICD-10-CM

## 2024-08-11 DIAGNOSIS — Z80.3: ICD-10-CM

## 2024-08-11 DIAGNOSIS — I12.9: ICD-10-CM

## 2024-08-11 DIAGNOSIS — N17.9: ICD-10-CM

## 2024-08-11 DIAGNOSIS — E66.9: ICD-10-CM

## 2024-08-11 DIAGNOSIS — K43.2: ICD-10-CM

## 2024-08-11 DIAGNOSIS — N18.9: ICD-10-CM

## 2024-08-11 LAB
ALBUMIN SERPL-MCNC: 3.4 G/DL (ref 3.4–5)
ALP SERPL-CCNC: 56 U/L (ref 45–117)
ALT SERPL-CCNC: 22 U/L (ref 13–61)
ANION GAP SERPL CALC-SCNC: 6 MMOL/L (ref 4–13)
APPEARANCE UR: CLEAR
APTT BLD: 28.5 SECONDS (ref 25.2–36.5)
AST SERPL-CCNC: 18 U/L (ref 15–37)
BACTERIA # UR AUTO: 288 /UL (ref 0–1359)
BASOPHILS # BLD: 0.4 % (ref 0–2)
BILIRUB SERPL-MCNC: 0.5 MG/DL (ref 0.2–1)
BILIRUB UR STRIP.AUTO-MCNC: NEGATIVE MG/DL
BUN SERPL-MCNC: 19.8 MG/DL (ref 7–18)
CALCIUM SERPL-MCNC: 8.9 MG/DL (ref 8.5–10.1)
CASTS URNS QL MICRO: 1 /UL (ref 0–3.1)
CHLORIDE SERPL-SCNC: 104 MMOL/L (ref 98–107)
CO2 SERPL-SCNC: 29 MMOL/L (ref 21–32)
COLOR UR: YELLOW
CREAT SERPL-MCNC: 2.1 MG/DL (ref 0.55–1.3)
DEPRECATED RDW RBC AUTO: 13.4 % (ref 11.6–15.6)
EOSINOPHIL # BLD: 1.3 % (ref 0–4.5)
EPITH CASTS URNS QL MICRO: 15 /UL (ref 0–25.1)
GLUCOSE SERPL-MCNC: 107 MG/DL (ref 74–106)
HCT VFR BLD CALC: 34.5 % (ref 32.4–45.2)
HGB BLD-MCNC: 11.6 GM/DL (ref 10.7–15.3)
INR BLD: 1.04 (ref 0.83–1.09)
KETONES UR QL STRIP: NEGATIVE
LEUKOCYTE ESTERASE UR QL STRIP.AUTO: NEGATIVE
LYMPHOCYTES # BLD: 25.1 % (ref 8–40)
MAGNESIUM SERPL-MCNC: 1.6 MG/DL (ref 1.8–2.4)
MCH RBC QN AUTO: 31.3 PG (ref 25.7–33.7)
MCHC RBC AUTO-ENTMCNC: 33.7 G/DL (ref 32–36)
MCV RBC: 92.8 FL (ref 80–96)
MONOCYTES # BLD AUTO: 9.5 % (ref 3.8–10.2)
NEUTROPHILS # BLD: 63.7 % (ref 42.8–82.8)
NITRITE UR QL STRIP: NEGATIVE
PH UR: 7 [PH] (ref 5–8)
PLATELET # BLD AUTO: 173 10^3/UL (ref 134–434)
PMV BLD: 7.3 FL (ref 7.5–11.1)
POTASSIUM SERPLBLD-SCNC: 3.7 MMOL/L (ref 3.5–5.1)
PROT SERPL-MCNC: 7 G/DL (ref 6.4–8.2)
PROT UR QL STRIP: NEGATIVE
PROT UR QL STRIP: NEGATIVE
PT PNL PPP: 11.9 SEC (ref 9.7–13)
RBC # BLD AUTO: 3.72 M/MM3 (ref 3.6–5.2)
RBC # BLD AUTO: 9 /UL (ref 0–23.9)
SODIUM SERPL-SCNC: 139 MMOL/L (ref 136–145)
SP GR UR: 1.01 (ref 1.01–1.03)
UROBILINOGEN UR STRIP-MCNC: 0.2 MG/DL (ref 0.2–1)
WBC # BLD AUTO: 5.3 K/MM3 (ref 4–10)
WBC # UR AUTO: 7 /UL (ref 0–25.8)

## 2024-08-11 PROCEDURE — G0378 HOSPITAL OBSERVATION PER HR: HCPCS

## 2024-08-11 RX ADMIN — HEPARIN SODIUM SCH UNIT: 5000 INJECTION, SOLUTION INTRAVENOUS; SUBCUTANEOUS at 23:40

## 2024-08-11 RX ADMIN — SODIUM CHLORIDE, POTASSIUM CHLORIDE, SODIUM LACTATE AND CALCIUM CHLORIDE ONE ML: 600; 310; 30; 20 INJECTION, SOLUTION INTRAVENOUS at 14:30

## 2024-08-11 RX ADMIN — SODIUM CHLORIDE, POTASSIUM CHLORIDE, SODIUM LACTATE AND CALCIUM CHLORIDE SCH MLS/HR: 600; 310; 30; 20 INJECTION, SOLUTION INTRAVENOUS at 18:05

## 2024-08-11 RX ADMIN — FAMOTIDINE ONE MLS/HR: 20 INJECTION, SOLUTION INTRAVENOUS at 14:30

## 2024-08-11 RX ADMIN — ONDANSETRON ONE MG: 2 INJECTION INTRAMUSCULAR; INTRAVENOUS at 14:30

## 2024-08-11 RX ADMIN — ACETAMINOPHEN ONE MG: 10 INJECTION, SOLUTION INTRAVENOUS at 14:30

## 2024-08-11 RX ADMIN — SODIUM CHLORIDE, POTASSIUM CHLORIDE, SODIUM LACTATE AND CALCIUM CHLORIDE ONE ML: 600; 310; 30; 20 INJECTION, SOLUTION INTRAVENOUS at 15:53

## 2024-08-11 RX ADMIN — MAGNESIUM SULFATE HEPTAHYDRATE ONE MLS/HR: 40 INJECTION, SOLUTION INTRAVENOUS at 15:53

## 2024-08-11 RX ADMIN — TAMSULOSIN HYDROCHLORIDE ONE MG: 0.4 CAPSULE ORAL at 18:05

## 2024-08-11 RX ADMIN — INSULIN ASPART SCH: 100 INJECTION, SOLUTION INTRAVENOUS; SUBCUTANEOUS at 21:12

## 2024-08-12 LAB
ALBUMIN SERPL-MCNC: 3.1 G/DL (ref 3.4–5)
ALP SERPL-CCNC: 55 U/L (ref 45–117)
ALT SERPL-CCNC: 20 U/L (ref 13–61)
ANION GAP SERPL CALC-SCNC: 6 MMOL/L (ref 4–13)
AST SERPL-CCNC: 17 U/L (ref 15–37)
BASOPHILS # BLD: 0.6 % (ref 0–2)
BILIRUB SERPL-MCNC: 0.4 MG/DL (ref 0.2–1)
BUN SERPL-MCNC: 16.8 MG/DL (ref 7–18)
CALCIUM SERPL-MCNC: 8.7 MG/DL (ref 8.5–10.1)
CHLORIDE SERPL-SCNC: 104 MMOL/L (ref 98–107)
CO2 SERPL-SCNC: 31 MMOL/L (ref 21–32)
CREAT SERPL-MCNC: 2.1 MG/DL (ref 0.55–1.3)
DEPRECATED RDW RBC AUTO: 13.3 % (ref 11.6–15.6)
EOSINOPHIL # BLD: 2.4 % (ref 0–4.5)
GLUCOSE SERPL-MCNC: 104 MG/DL (ref 74–106)
HCT VFR BLD CALC: 31.7 % (ref 32.4–45.2)
HGB BLD-MCNC: 10.9 GM/DL (ref 10.7–15.3)
LYMPHOCYTES # BLD: 19.4 % (ref 8–40)
MAGNESIUM SERPL-MCNC: 2.1 MG/DL (ref 1.8–2.4)
MCH RBC QN AUTO: 31.9 PG (ref 25.7–33.7)
MCHC RBC AUTO-ENTMCNC: 34.4 G/DL (ref 32–36)
MCV RBC: 92.6 FL (ref 80–96)
MONOCYTES # BLD AUTO: 8.5 % (ref 3.8–10.2)
NEUTROPHILS # BLD: 69.1 % (ref 42.8–82.8)
PHOSPHATE SERPL-MCNC: 3.9 MG/DL (ref 2.5–4.9)
PLATELET # BLD AUTO: 162 10^3/UL (ref 134–434)
PMV BLD: 8.2 FL (ref 7.5–11.1)
POTASSIUM SERPLBLD-SCNC: 3.7 MMOL/L (ref 3.5–5.1)
PROT SERPL-MCNC: 6.4 G/DL (ref 6.4–8.2)
RBC # BLD AUTO: 3.43 M/MM3 (ref 3.6–5.2)
SODIUM SERPL-SCNC: 141 MMOL/L (ref 136–145)
WBC # BLD AUTO: 4.3 K/MM3 (ref 4–10)

## 2024-08-12 RX ADMIN — ANASTROZOLE SCH MG: 1 TABLET, COATED ORAL at 09:26

## 2024-08-12 RX ADMIN — ROSUVASTATIN CALCIUM SCH MG: 5 TABLET, FILM COATED ORAL at 21:12

## 2024-08-12 RX ADMIN — SODIUM CHLORIDE SCH MLS/HR: 9 INJECTION, SOLUTION INTRAVENOUS at 06:46

## 2024-08-12 RX ADMIN — ACETAMINOPHEN PRN MG: 10 INJECTION, SOLUTION INTRAVENOUS at 08:58

## 2024-08-12 RX ADMIN — TAMSULOSIN HYDROCHLORIDE SCH MG: 0.4 CAPSULE ORAL at 21:12

## 2024-08-12 RX ADMIN — LISINOPRIL SCH MG: 20 TABLET ORAL at 09:27

## 2024-08-12 RX ADMIN — SODIUM CHLORIDE, POTASSIUM CHLORIDE, SODIUM LACTATE AND CALCIUM CHLORIDE SCH MLS/HR: 600; 310; 30; 20 INJECTION, SOLUTION INTRAVENOUS at 14:35

## 2024-08-12 RX ADMIN — SERTRALINE HYDROCHLORIDE SCH MG: 50 TABLET ORAL at 09:27

## 2024-08-12 RX ADMIN — TAMSULOSIN HYDROCHLORIDE SCH MG: 0.4 CAPSULE ORAL at 09:26

## 2024-08-12 RX ADMIN — CEFTRIAXONE SCH MLS/HR: 1 INJECTION, POWDER, FOR SOLUTION INTRAMUSCULAR; INTRAVENOUS at 18:13

## 2024-08-12 RX ADMIN — HYDROCHLOROTHIAZIDE SCH MG: 25 TABLET ORAL at 09:26

## 2024-08-13 VITALS — RESPIRATION RATE: 18 BRPM

## 2024-08-13 LAB
ALBUMIN SERPL-MCNC: 2.9 G/DL (ref 3.4–5)
ALP SERPL-CCNC: 51 U/L (ref 45–117)
ALT SERPL-CCNC: 19 U/L (ref 13–61)
ANION GAP SERPL CALC-SCNC: 5 MMOL/L (ref 4–13)
AST SERPL-CCNC: 20 U/L (ref 15–37)
BASOPHILS # BLD: 0.6 % (ref 0–2)
BILIRUB SERPL-MCNC: 0.3 MG/DL (ref 0.2–1)
BUN SERPL-MCNC: 13.8 MG/DL (ref 7–18)
CALCIUM SERPL-MCNC: 8.5 MG/DL (ref 8.5–10.1)
CHLORIDE SERPL-SCNC: 104 MMOL/L (ref 98–107)
CO2 SERPL-SCNC: 29 MMOL/L (ref 21–32)
CREAT SERPL-MCNC: 2 MG/DL (ref 0.55–1.3)
DEPRECATED RDW RBC AUTO: 13.1 % (ref 11.6–15.6)
EOSINOPHIL # BLD: 2.2 % (ref 0–4.5)
GLUCOSE SERPL-MCNC: 127 MG/DL (ref 74–106)
HCT VFR BLD CALC: 30.9 % (ref 32.4–45.2)
HGB BLD-MCNC: 10.3 GM/DL (ref 10.7–15.3)
LYMPHOCYTES # BLD: 21.6 % (ref 8–40)
MAGNESIUM SERPL-MCNC: 1.9 MG/DL (ref 1.8–2.4)
MCH RBC QN AUTO: 31.5 PG (ref 25.7–33.7)
MCHC RBC AUTO-ENTMCNC: 33.5 G/DL (ref 32–36)
MCV RBC: 93.9 FL (ref 80–96)
MONOCYTES # BLD AUTO: 10.4 % (ref 3.8–10.2)
NEUTROPHILS # BLD: 65.2 % (ref 42.8–82.8)
PHOSPHATE SERPL-MCNC: 3.4 MG/DL (ref 2.5–4.9)
PLATELET # BLD AUTO: 164 10^3/UL (ref 134–434)
PMV BLD: 8.5 FL (ref 7.5–11.1)
POTASSIUM SERPLBLD-SCNC: 4.3 MMOL/L (ref 3.5–5.1)
PROT SERPL-MCNC: 6.1 G/DL (ref 6.4–8.2)
RBC # BLD AUTO: 3.29 M/MM3 (ref 3.6–5.2)
SODIUM SERPL-SCNC: 139 MMOL/L (ref 136–145)
WBC # BLD AUTO: 4.1 K/MM3 (ref 4–10)

## 2024-08-13 PROCEDURE — 0TC68ZZ EXTIRPATION OF MATTER FROM RIGHT URETER, VIA NATURAL OR ARTIFICIAL OPENING ENDOSCOPIC: ICD-10-PCS | Performed by: UROLOGY

## 2024-08-13 PROCEDURE — 0T768DZ DILATION OF RIGHT URETER WITH INTRALUMINAL DEVICE, VIA NATURAL OR ARTIFICIAL OPENING ENDOSCOPIC: ICD-10-PCS | Performed by: UROLOGY

## 2024-08-13 RX ADMIN — SODIUM CHLORIDE, SODIUM GLUCONATE, SODIUM ACETATE, POTASSIUM CHLORIDE, AND MAGNESIUM CHLORIDE SCH: 526; 502; 368; 37; 30 INJECTION, SOLUTION INTRAVENOUS at 16:13

## 2024-08-13 RX ADMIN — ACETAMINOPHEN ONE MG: 10 INJECTION, SOLUTION INTRAVENOUS at 14:36

## 2024-08-13 RX ADMIN — ERGOCALCIFEROL SCH UNIT: 1.25 CAPSULE ORAL at 10:50

## 2024-08-13 RX ADMIN — SODIUM CHLORIDE, POTASSIUM CHLORIDE, SODIUM LACTATE AND CALCIUM CHLORIDE SCH MLS/HR: 600; 310; 30; 20 INJECTION, SOLUTION INTRAVENOUS at 14:37

## 2024-08-13 RX ADMIN — TAMSULOSIN HYDROCHLORIDE SCH: 0.4 CAPSULE ORAL at 08:47

## 2024-08-14 LAB
ALBUMIN SERPL-MCNC: 3 G/DL (ref 3.4–5)
ALP SERPL-CCNC: 56 U/L (ref 45–117)
ALT SERPL-CCNC: 19 U/L (ref 13–61)
ANION GAP SERPL CALC-SCNC: 5 MMOL/L (ref 4–13)
AST SERPL-CCNC: 13 U/L (ref 15–37)
BASOPHILS # BLD: 0.6 % (ref 0–2)
BILIRUB SERPL-MCNC: 0.2 MG/DL (ref 0.2–1)
BUN SERPL-MCNC: 13.7 MG/DL (ref 7–18)
CALCIUM SERPL-MCNC: 9 MG/DL (ref 8.5–10.1)
CHLORIDE SERPL-SCNC: 102 MMOL/L (ref 98–107)
CO2 SERPL-SCNC: 32 MMOL/L (ref 21–32)
CREAT SERPL-MCNC: 1.5 MG/DL (ref 0.55–1.3)
DEPRECATED RDW RBC AUTO: 13.2 % (ref 11.6–15.6)
EOSINOPHIL # BLD: 0.4 % (ref 0–4.5)
GLUCOSE SERPL-MCNC: 178 MG/DL (ref 74–106)
HCT VFR BLD CALC: 33.4 % (ref 32.4–45.2)
HGB BLD-MCNC: 11.1 GM/DL (ref 10.7–15.3)
LYMPHOCYTES # BLD: 10.7 % (ref 8–40)
MAGNESIUM SERPL-MCNC: 1.9 MG/DL (ref 1.8–2.4)
MCH RBC QN AUTO: 31.1 PG (ref 25.7–33.7)
MCHC RBC AUTO-ENTMCNC: 33.3 G/DL (ref 32–36)
MCV RBC: 93.5 FL (ref 80–96)
MONOCYTES # BLD AUTO: 6.3 % (ref 3.8–10.2)
NEUTROPHILS # BLD: 82 % (ref 42.8–82.8)
PHOSPHATE SERPL-MCNC: 4 MG/DL (ref 2.5–4.9)
PLATELET # BLD AUTO: 177 10^3/UL (ref 134–434)
PMV BLD: 9.1 FL (ref 7.5–11.1)
POTASSIUM SERPLBLD-SCNC: 4.5 MMOL/L (ref 3.5–5.1)
PROT SERPL-MCNC: 6.8 G/DL (ref 6.4–8.2)
RBC # BLD AUTO: 3.57 M/MM3 (ref 3.6–5.2)
SODIUM SERPL-SCNC: 139 MMOL/L (ref 136–145)
WBC # BLD AUTO: 5.5 K/MM3 (ref 4–10)

## 2024-08-14 RX ADMIN — AMOXICILLIN AND CLAVULANATE POTASSIUM SCH TAB: 500; 125 TABLET, FILM COATED ORAL at 18:02

## 2024-08-15 VITALS — HEART RATE: 84 BPM | TEMPERATURE: 99.1 F | SYSTOLIC BLOOD PRESSURE: 150 MMHG | DIASTOLIC BLOOD PRESSURE: 85 MMHG

## 2024-08-15 LAB
ANION GAP SERPL CALC-SCNC: 4 MMOL/L (ref 4–13)
BUN SERPL-MCNC: 19.3 MG/DL (ref 7–18)
CALCIUM SERPL-MCNC: 8.5 MG/DL (ref 8.5–10.1)
CHLORIDE SERPL-SCNC: 104 MMOL/L (ref 98–107)
CO2 SERPL-SCNC: 32 MMOL/L (ref 21–32)
CREAT SERPL-MCNC: 1.4 MG/DL (ref 0.55–1.3)
GLUCOSE SERPL-MCNC: 102 MG/DL (ref 74–106)
POTASSIUM SERPLBLD-SCNC: 3.9 MMOL/L (ref 3.5–5.1)
SODIUM SERPL-SCNC: 140 MMOL/L (ref 136–145)

## 2024-08-15 RX ADMIN — LIDOCAINE ONE PATCH: 50 PATCH TOPICAL at 11:06

## 2025-02-19 ENCOUNTER — HOSPITAL ENCOUNTER (OUTPATIENT)
Dept: HOSPITAL 74 - JASU-SURG | Age: 75
Discharge: HOME | End: 2025-02-19
Attending: UROLOGY
Payer: COMMERCIAL

## 2025-02-19 VITALS — DIASTOLIC BLOOD PRESSURE: 78 MMHG | TEMPERATURE: 97.8 F | SYSTOLIC BLOOD PRESSURE: 144 MMHG | HEART RATE: 83 BPM

## 2025-02-19 VITALS — BODY MASS INDEX: 35.9 KG/M2

## 2025-02-19 VITALS — RESPIRATION RATE: 18 BRPM

## 2025-02-19 DIAGNOSIS — N39.3: Primary | ICD-10-CM

## 2025-02-19 PROCEDURE — 0TSD0ZZ REPOSITION URETHRA, OPEN APPROACH: ICD-10-PCS | Performed by: UROLOGY

## 2025-02-19 PROCEDURE — C1771 REP DEV, URINARY, W/SLING: HCPCS

## 2025-02-19 PROCEDURE — 57288 REPAIR BLADDER DEFECT: CPT

## 2025-02-19 RX ADMIN — BUPIVACAINE HYDROCHLORIDE ONE MG: 5 INJECTION, SOLUTION EPIDURAL; INTRACAUDAL; PERINEURAL at 08:05

## 2025-02-19 RX ADMIN — LIDOCAINE HYDROCHLORIDE ONE MG: 10 INJECTION, SOLUTION INFILTRATION; PERINEURAL at 08:05
